# Patient Record
Sex: FEMALE | Race: WHITE | Employment: FULL TIME | URBAN - NONMETROPOLITAN AREA
[De-identification: names, ages, dates, MRNs, and addresses within clinical notes are randomized per-mention and may not be internally consistent; named-entity substitution may affect disease eponyms.]

---

## 2019-08-22 ENCOUNTER — NURSE ONLY (OUTPATIENT)
Dept: LAB | Age: 49
End: 2019-08-22

## 2020-01-03 ENCOUNTER — HOSPITAL ENCOUNTER (EMERGENCY)
Age: 50
Discharge: HOME | End: 2020-01-03

## 2020-01-03 VITALS
TEMPERATURE: 97.8 F | HEART RATE: 56 BPM | SYSTOLIC BLOOD PRESSURE: 113 MMHG | WEIGHT: 176 LBS | RESPIRATION RATE: 16 BRPM | DIASTOLIC BLOOD PRESSURE: 75 MMHG | HEIGHT: 66 IN | BODY MASS INDEX: 28.28 KG/M2 | OXYGEN SATURATION: 100 %

## 2020-01-03 PROCEDURE — 99284 EMERGENCY DEPT VISIT MOD MDM: CPT

## 2020-01-03 RX ORDER — ATORVASTATIN CALCIUM 20 MG/1
TABLET, FILM COATED ORAL
COMMUNITY
Start: 2019-10-28

## 2020-01-03 RX ORDER — LISINOPRIL 20 MG/1
TABLET ORAL
COMMUNITY
Start: 2019-10-28

## 2020-01-03 RX ORDER — GLIPIZIDE 2.5 MG/1
TABLET, EXTENDED RELEASE ORAL
COMMUNITY
Start: 2019-12-22

## 2020-01-03 ASSESSMENT — ENCOUNTER SYMPTOMS
PHOTOPHOBIA: 0
NAUSEA: 0
SORE THROAT: 0
BACK PAIN: 0
RHINORRHEA: 0
VOMITING: 0
ABDOMINAL PAIN: 1
SHORTNESS OF BREATH: 0
DIARRHEA: 1
COUGH: 0

## 2020-01-03 NOTE — ED PROVIDER NOTES
[facility] EMERGENCY DEPT      CHIEF COMPLAINT       Chief Complaint   Patient presents with     Diarrhea, dizziness       Nurses Notes reviewed and I agree except as noted in the HPI. HISTORY OF PRESENT ILLNESS    Obi Rowland is a 52 y.o. female who presents for dizziness. Patient was at work when she began to experience lower abdominal cramping. Patient felt like she needed to have a bowel movement so she went to the bathroom. She then experienced diarrhea that she states was \"gushing out\". Patient felt hot and notes that her arms and legs were red. She was also experiencing diaphoresis and light headedness. Patient then left the bathroom and went back to work. She states that she experienced another episode of this before having her work call 911 as she developed numbness in her bottom lip and hands. Patient states that she was crying at the time and was very nervous. She took 2 glucose tablets PTA. She states that when her blood sugar drops to the 80s or 90s she becomes diaphoretic. Patient did eat breakfast and lunch today. She notes that she made sauerkraut, broccoli, biscuits, and pork two days ago and has been eating for her meals since. She has no symptoms currently. No chest pain, shortness of breath, nausea, or emesis. Patient has never experienced this before. Past medical history of arthritis, DM, and HLD. No further complaints at initial encounter. Location/Symptom: diarrhea, diaphoresis, numbness, red extremities  Timing/Onset: PTA  Context/Setting: patient was at work   Quality: abdominal cramping   Modifying Factors: nervous/ anxious     REVIEW OF SYSTEMS     Review of Systems   Constitutional: Positive for diaphoresis. Negative for appetite change and chills. Fever: felt hot. HENT: Negative for congestion, ear pain, rhinorrhea and sore throat. Eyes: Negative for photophobia. Respiratory: Negative for cough and shortness of breath. Cardiovascular: Negative for chest pain. Gastrointestinal: Positive for abdominal pain (cramping) and diarrhea. Negative for nausea and vomiting. Endocrine: Negative for polyuria. Genitourinary: Negative for difficulty urinating, dysuria, flank pain and frequency. Musculoskeletal: Negative for back pain and gait problem. Skin: Negative for rash. Neurological: Positive for light-headedness and numbness (lips and hands). Negative for dizziness and weakness. Psychiatric/Behavioral: Negative for confusion and sleep disturbance. PAST MEDICAL HISTORY    has a past medical history of Arthritis, Diabetes mellitus ([facility] [address] and Hyperlipidemia. SURGICAL HISTORY      has no past surgical history on file. CURRENT MEDICATIONS       Discharge Medication List as of 1/3/2020  2:42 PM      CONTINUE these medications which have NOT CHANGED    Details   atorvastatin (LIPITOR) 20 MG tablet TAKE 1 TABLET BY MOUTH ONE TIME A DAYHistorical Med      glipiZIDE (GLUCOTROL XL) 2.5 MG extended release tablet TK 1 T PO QD. Historical Med      lisinopril (PRINIVIL;ZESTRIL) 20 MG tablet TAKE 1 TABLET BY MOUTH ONE TIME A DAYHistorical Med      metFORMIN (GLUCOPHAGE) 500 MG tablet TAKE 2 TABLETS BY MOUTH TWO TIMES A DAYHistorical Med             ALLERGIES     has No Known Allergies. FAMILY HISTORY     has no family status information on file. family history is not on file. SOCIAL HISTORY    reports that she has been smoking. She has been smoking about 0.50 packs per day. She has never used smokeless tobacco. She reports previous alcohol use. She reports previous drug use. PHYSICAL EXAM     INITIAL VITALS:  height is 5' 6\" (1.676 m) and weight is 176 lb (79.8 kg). Her oral temperature is 97.8 °F (36.6 °C). Her blood pressure is 113/75 and her pulse is 56. Her respiration is 16 and oxygen saturation is 100%. Physical Exam  Vitals signs and nursing note reviewed. Constitutional:       General: She is not in acute distress.      Appearance: Normal appearance. She is well-developed. She is not toxic-appearing or diaphoretic. HENT:      Head: Normocephalic and atraumatic. Right Ear: Hearing normal.      Left Ear: Hearing normal.      Nose: Nose normal. No rhinorrhea. Mouth/Throat:      Lips: Pink. Mouth: Mucous membranes are moist.      Pharynx: Uvula midline. Eyes:      General: Lids are normal. No scleral icterus. Extraocular Movements: Extraocular movements intact. Conjunctiva/sclera: Conjunctivae normal.      Pupils: Pupils are equal, round, and reactive to light. Neck:      Musculoskeletal: No neck rigidity. Vascular: No JVD. Trachea: Trachea normal. No tracheal deviation. Cardiovascular:      Rate and Rhythm: Normal rate and regular rhythm. Heart sounds: Normal heart sounds. Pulmonary:      Effort: Pulmonary effort is normal. No respiratory distress. Breath sounds: Normal breath sounds. No decreased breath sounds or wheezing. Abdominal:      General: Bowel sounds are normal. There is no distension. Palpations: Abdomen is soft. Abdomen is not rigid. There is no pulsatile mass. Tenderness: There is no tenderness. There is no right CVA tenderness, left CVA tenderness, guarding or rebound. Negative signs include Bustillo's sign and McBurney's sign. Hernia: No hernia is present. Musculoskeletal: Normal range of motion. Comments: Movement normal as observed   Lymphadenopathy:      Cervical: No cervical adenopathy. Skin:     General: Skin is warm and dry. Coloration: Skin is not pale. Findings: No rash. Neurological:      General: No focal deficit present. Mental Status: She is alert and oriented to person, place, and time. Gait: Gait normal.   Psychiatric:         Mood and Affect: Mood normal.         Speech: Speech normal.         Behavior: Behavior normal.         Thought Content:  Thought content normal.         Cognition and Memory: Cognition normal. DIFFERENTIAL DIAGNOSIS:   Including but not limited to: vasovagal near-syncope, viral illness, infectious diarrhea, diarrhea caused by dietary changes, gastritis, dehydration, hypoglycemia     DIAGNOSTIC RESULTS     EKG: All [facility]'s [facility] who either signs or Co-signs this chart in the absence of a cardiologist.    None     RADIOLOGY: non-plain film images(s) such as CT,Ultrasound and MRI are read by the radiologist.  Plain radiographic images are visualized and preliminarily interpreted by the emergency physician unless otherwise stated below. No orders to display       LABS:   Labs Reviewed - No data to display    EMERGENCY DEPARTMENT COURSE:   Vitals:    Vitals:    01/03/20 1350 01/03/20 1459   BP: 115/67 113/75   Pulse: 56 56   Resp: 22 16   Temp: 97.8 °F (36.6 °C)    TempSrc: Oral    SpO2: 97% 100%   Weight: 176 lb (79.8 kg)    Height: 5' 6\" (1.676 m)      2:00PM Patient was seen and evaluated. I discussed about ordering a work up with an EKG but the patient declined due to insurance issues. MDM:  The patient was seen and evaluated within the ED today for diarrhea, and vasovagal episode. On exam, I appreciated no acute findings. All symptoms were resolved. Old records were reviewed. Within the department, I observed the patient's vital signs to be within acceptable range. The patient declined testing while in the ER. She stated she had no insurance, just paid off a dental bill, and all her symptoms were resolved. I believed she had a vasovagal episode during her copious bout of diarrhea and explained that diagnosis to the patient. She felt reassured. Anticipatory guidance was given. The patient was instructed to return to the emergency department for any worsening of their symptoms. Patient was discharged from the emergency department in good condition with all questions answered. See disposition below.  I have given the patient strict written and verbal instructions about care at home, follow-up, and signs and symptoms of worsening of condition and they did verbalize understanding. CRITICAL CARE:   None    CONSULTS:  None    PROCEDURES:  None    FINAL IMPRESSION      1. Diarrhea, unspecified type    2. Vasovagal episode          DISPOSITION/PLAN     1. Diarrhea, unspecified type    2. Vasovagal episode        PATIENT REFERRED TO:  Chavo Higuera MD  [facility] [id number]  [phone]    Schedule an appointment as soon as possible for a visit in 3 days        DISCHARGE MEDICATIONS:  Discharge Medication List as of 1/3/2020  2:42 PM          (Please note that portions of this note were completed with a voice recognition program.  Efforts were made to edit the dictations but occasionally words are mis-transcribed.)    Scribe:  Emily Gonzales 1/3/20 2:40 PM Scribing for and in the presence of SHAQ Martinez. Signed by: Bruno Brown 01/03/20 6:43 PM    Provider:  I personally performed the services described in the documentation, reviewed and edited the documentation which was dictated to the scribe in my presence, and it accurately records my words and actions.     SHAQ Martinez 01/03/20 6:43 PM    SHAQ Martinez [place]  01/03/20 [id number]

## 2020-01-03 NOTE — ED NOTES
Released with discharge instructions- no questions or concerns noted-INT per EMS discontinued at this time without difficulty-      Presley Martin RN  01/03/20 [id number]

## 2020-01-03 NOTE — ED TRIAGE NOTES
Pt to the ED per [facility]- She was at work and had gone to the bathroom-After a liquid stool she became flushed and diaphoretic and dizzy- She was able to make it in to her work place, but had tingling to lips and hands and a stiff feeling to her jaw-Pt declines EKG at this time-wants to wait until sees doctor for testing

## 2025-04-29 ENCOUNTER — LAB (OUTPATIENT)
Dept: LAB | Age: 55
End: 2025-04-29

## 2025-05-01 ENCOUNTER — TRANSCRIBE ORDERS (OUTPATIENT)
Dept: ADMINISTRATIVE | Age: 55
End: 2025-05-01

## 2025-05-01 DIAGNOSIS — Z12.31 ENCOUNTER FOR SCREENING MAMMOGRAM FOR MALIGNANT NEOPLASM OF BREAST: Primary | ICD-10-CM

## 2025-05-08 LAB — CYTOLOGY THIN PREP PAP: NORMAL

## 2025-05-15 LAB — CYTOLOGY THIN PREP PAP: NORMAL
